# Patient Record
Sex: FEMALE
[De-identification: names, ages, dates, MRNs, and addresses within clinical notes are randomized per-mention and may not be internally consistent; named-entity substitution may affect disease eponyms.]

---

## 2024-08-27 PROBLEM — Z00.00 ENCOUNTER FOR PREVENTIVE HEALTH EXAMINATION: Status: ACTIVE | Noted: 2024-08-27

## 2024-08-28 ENCOUNTER — APPOINTMENT (OUTPATIENT)
Dept: OTOLARYNGOLOGY | Facility: CLINIC | Age: 24
End: 2024-08-28
Payer: COMMERCIAL

## 2024-08-28 ENCOUNTER — TRANSCRIPTION ENCOUNTER (OUTPATIENT)
Age: 24
End: 2024-08-28

## 2024-08-28 VITALS — BODY MASS INDEX: 18.51 KG/M2 | HEIGHT: 69 IN | WEIGHT: 125 LBS

## 2024-08-28 PROCEDURE — 99204 OFFICE O/P NEW MOD 45 MIN: CPT | Mod: 25

## 2024-08-28 PROCEDURE — 69801 INCISE INNER EAR: CPT | Mod: LT

## 2024-08-28 RX ORDER — PREDNISONE 10 MG
TABLET ORAL
Refills: 0 | Status: ACTIVE | COMMUNITY

## 2024-08-28 NOTE — HISTORY OF PRESENT ILLNESS
[de-identified] : Referred by ENT colleague for subspecialty evaluation. MAGNO VAUGHN has a history of sudden onset of hearing loss in the left ear on August 24, 2024. Started on prednisone 60mg per day 2 days ago. No perceived improvement. No known trigger except Covid infection 1 month ago with full recovery.  No skin lesions noted. No vertigo. Mild tinnitus reported No prior ear disease.  MRI reported normal done 8/26/24.

## 2024-08-28 NOTE — PROCEDURE
[FreeTextEntry3] : Procedure: LEFT Intratympanic vestibulo-active agent injection  Pre procedure diagnosis: Sudden Hearing Loss Post procedure diagnosis: Sudden Hearing Loss  Management options reviewed in detail. All risks limitations complications and alternatives reviewed with the patient who agreed. Injection: dexamethasone 10 mg/ml  0.3cc used Anesthesia: topical phenol  Dwell Time: 20 Minutes Estimated Blood Loss: None Complications: None

## 2024-08-28 NOTE — CONSULT LETTER
[Please see my note below.] : Please see my note below. [FreeTextEntry2] : Dear TABATHA VALLES  [FreeTextEntry1] : Thank you for allowing me to participate in the care of Jimmy Gilbert . Please see the attached visit note.    [facility] Medical Director of [facility] Healthcare Department of Otolaryngology [facility]

## 2024-08-28 NOTE — DATA REVIEWED
[de-identified] : Audiometry dated August 26, 2024 provided and reviewed.  Severe to profound hearing loss identified in the left ear with 0% speech recognition.

## 2024-08-28 NOTE — ASSESSMENT
[FreeTextEntry1] : Idiopathic sudden sensorineural hearing loss with no perceived response following 3 days of oral steroids.  We discussed management options in detail.  She wished to proceed with intratympanic therapy which was initiated today.  Wound instructions reviewed. Continued oral steroids recommended.   I have also provided her referral information for hyperbaric oxygen should she wish to consider this.  Follow-up recommended in 1 week with repeat audiometry.  Further therapy to be considered.

## 2024-09-03 ENCOUNTER — APPOINTMENT (OUTPATIENT)
Dept: OTOLARYNGOLOGY | Facility: CLINIC | Age: 24
End: 2024-09-03
Payer: COMMERCIAL

## 2024-09-03 PROBLEM — H91.22 SUDDEN LEFT HEARING LOSS: Status: ACTIVE | Noted: 2024-08-28

## 2024-09-03 PROCEDURE — 69801 INCISE INNER EAR: CPT | Mod: LT

## 2024-09-03 PROCEDURE — 92567 TYMPANOMETRY: CPT

## 2024-09-03 PROCEDURE — 99214 OFFICE O/P EST MOD 30 MIN: CPT | Mod: 25

## 2024-09-03 PROCEDURE — 92557 COMPREHENSIVE HEARING TEST: CPT

## 2024-09-03 NOTE — CONSULT LETTER
[Please see my note below.] : Please see my note below. [FreeTextEntry2] : Dear TABATHA VALLES  [FreeTextEntry1] : Thank you for allowing me to participate in the care of Jimmy Gilbert . Please see the attached visit note.    [facility] Medical Director of Hearing Healthcare Department of Otolaryngology [facility]

## 2024-09-03 NOTE — PHYSICAL EXAM
[Normal] : temporomandibular joint is normal [FreeTextEntry1] : Procedure: Microscopic Ear Exam  Right ear:   Ear canal intact without inflammation or lesion.   Tympanic membrane intact without inflammation.  Left ear:   Ear canal intact without inflammation or lesion.  Surgical puncture sites remain.  No inflammation.

## 2024-09-03 NOTE — DATA REVIEWED
[de-identified] : In light of the patients current symptoms, Complete audiometry was ordered and completed today. I have interpreted these results and reviewed them in detail with the patient.  Slight improvement in hearing thresholds identified with poor speech recognition.  Patent tympanometry

## 2024-09-03 NOTE — HISTORY OF PRESENT ILLNESS
[de-identified] :  Referred by ENT colleague for subspecialty evaluation. MAGNO VAUGHN has a history of sudden onset of hearing loss in the left ear on August 24, 2024. Started on prednisone 60mg per day 2 days ago. No perceived improvement. No known trigger except Covid infection 1 month ago with full recovery. No skin lesions noted. No vertigo. Mild tinnitus reported No prior ear disease. MRI reported normal done 8/26/24. [FreeTextEntry1] : 09/03/2024 Possible slight improvement in hearing reported.  There is a crackling noise noticed.  MIld tinnitus unchanged. No perceived hearing of speech/recognition reported.  Recently started on hyperbaric oxygen.  Her first treatment was today. On 40mg today, 30 tomorrow prednisone.

## 2024-09-06 PROBLEM — H90.42 SENSORINEURAL HEARING LOSS (SNHL) OF LEFT EAR WITH UNRESTRICTED HEARING OF RIGHT EAR: Status: ACTIVE | Noted: 2024-08-28

## 2024-09-09 ENCOUNTER — APPOINTMENT (OUTPATIENT)
Dept: OTOLARYNGOLOGY | Facility: CLINIC | Age: 24
End: 2024-09-09
Payer: COMMERCIAL

## 2024-09-09 VITALS — HEIGHT: 69 IN | WEIGHT: 125 LBS | BODY MASS INDEX: 18.51 KG/M2

## 2024-09-09 DIAGNOSIS — H93.299 OTHER ABNORMAL AUDITORY PERCEPTIONS, UNSPECIFIED EAR: ICD-10-CM

## 2024-09-09 PROCEDURE — 99214 OFFICE O/P EST MOD 30 MIN: CPT | Mod: 25

## 2024-09-09 PROCEDURE — 69801 INCISE INNER EAR: CPT | Mod: LT

## 2024-09-09 PROCEDURE — 92557 COMPREHENSIVE HEARING TEST: CPT

## 2024-09-09 PROCEDURE — 92550 TYMPANOMETRY & REFLEX THRESH: CPT | Mod: 52

## 2024-09-09 NOTE — HISTORY OF PRESENT ILLNESS
[de-identified] :  Referred by ENT colleague for subspecialty evaluation. MAGNO VAUGHN has a history of sudden onset of hearing loss in the left ear on August 24, 2024. Started on prednisone 60mg per day 2 days ago. No perceived improvement. No known trigger except Covid infection 1 month ago with full recovery. No skin lesions noted. No vertigo. Mild tinnitus reported No prior ear disease. MRI reported normal done 8/26/24. [FreeTextEntry1] : 09/09/2024 No perceive change in hearing. No ear pain.  Continues on oral prednisone now at 10 mg daily.  Also continues hyperbaric oxygen

## 2024-09-09 NOTE — PROCEDURE
[FreeTextEntry3] : Procedure: LEFT Intratympanic vestibulo-active agent injection  Pre procedure diagnosis: Sudden Hearing Loss Post procedure diagnosis: Sudden Hearing Loss  Management options reviewed in detail. All risks limitations complications and alternatives reviewed with the patient who agreed. Injection: dexamethasone 10 mg/ml  0.3cc used Anesthesia: none   Dwell Time: 20 Minutes Estimated Blood Loss: None Complications: None

## 2024-09-09 NOTE — ASSESSMENT
[FreeTextEntry1] :  Sudden hearing loss with minimal recovery.  Now completed 3 intratympanic steroid injections.  Continues on oral steroids and hyperbaric oxygen.  Clinical monitoring advised.  Follow-up with repeat audiometry in 1 week.

## 2024-09-09 NOTE — DATA REVIEWED
[de-identified] : In light of the patients current symptoms, Complete audiometry was ordered and completed today. I have interpreted these results and reviewed them in detail with the patient.  Severe to moderate hearing loss with slightly improved speech recognition left ear

## 2024-09-16 ENCOUNTER — APPOINTMENT (OUTPATIENT)
Dept: OTOLARYNGOLOGY | Facility: CLINIC | Age: 24
End: 2024-09-16
Payer: COMMERCIAL

## 2024-09-16 VITALS — HEIGHT: 69 IN | WEIGHT: 125 LBS | BODY MASS INDEX: 18.51 KG/M2

## 2024-09-16 DIAGNOSIS — H90.42 SENSORINEURAL HEARING LOSS, UNILATERAL, LEFT EAR, WITH UNRESTRICTED HEARING ON THE CONTRALATERAL SIDE: ICD-10-CM

## 2024-09-16 DIAGNOSIS — H91.22 SUDDEN IDIOPATHIC HEARING LOSS, LEFT EAR: ICD-10-CM

## 2024-09-16 PROCEDURE — 69801 INCISE INNER EAR: CPT | Mod: LT

## 2024-09-16 PROCEDURE — 92567 TYMPANOMETRY: CPT

## 2024-09-16 PROCEDURE — 99214 OFFICE O/P EST MOD 30 MIN: CPT | Mod: 25

## 2024-09-16 PROCEDURE — 92557 COMPREHENSIVE HEARING TEST: CPT

## 2024-09-16 RX ORDER — CIPROFLOXACIN AND DEXAMETHASONE 3; 1 MG/ML; MG/ML
0.3-0.1 SUSPENSION/ DROPS AURICULAR (OTIC)
Qty: 8 | Refills: 0 | Status: ACTIVE | COMMUNITY
Start: 2024-08-25

## 2024-09-16 RX ORDER — PREDNISONE 10 MG/1
10 TABLET ORAL
Qty: 35 | Refills: 0 | Status: ACTIVE | COMMUNITY
Start: 2024-09-16 | End: 1900-01-01

## 2024-09-16 NOTE — ASSESSMENT
[FreeTextEntry1] : Improving hearing loss in the left ear following treatment for sudden sensorineural hearing loss.  She has completed oral steroids and hyperbaric oxygen.  She has completed 3 intratympanic steroid injections.  We carefully reviewed management options in detail.  There appears to be improvement in the high frequencies which may suggest response to intratympanic therapy.  She wished to proceed with intratympanic therapy which was achieved today.  We also discussed the option of further oral steroids which she wished to proceed with.  Risks benefits and alternatives discussed.  I have placed her on a high-dose steroid taper.  Follow-up with repeat audiometry in 3 weeks and as needed.

## 2024-09-16 NOTE — DATA REVIEWED
[de-identified] : In light of the patients current symptoms, Complete audiometry was ordered and completed today. I have interpreted these results and reviewed them in detail with the patient.  Improved high-frequency hearing loss with persistent impaired speech recognition

## 2024-09-16 NOTE — REASON FOR VISIT
[Subsequent Evaluation] : a subsequent evaluation for [FreeTextEntry2] : sudden hearing loss in the left ear

## 2024-09-16 NOTE — PHYSICAL EXAM
[Normal] : temporomandibular joint is normal [FreeTextEntry1] : Procedure: Microscopic Ear Exam  Right ear:   Ear canal intact without inflammation or lesion.   Tympanic membrane intact without inflammation.  Left ear:   Ear canal intact without inflammation or lesion.  Small puncture sites now healed following intratympanic injection.  No inflammation.

## 2024-09-16 NOTE — CONSULT LETTER
[Please see my note below.] : Please see my note below. [FreeTextEntry2] : Dear TABATHA VALLES  [FreeTextEntry1] : Thank you for allowing me to participate in the care of Jimmy Gilbert . Please see the attached visit note.    [facility] Medical Director of [facility] Department of Otolaryngology [facility]

## 2024-09-16 NOTE — HISTORY OF PRESENT ILLNESS
[de-identified] : Referred by ENT colleague for subspecialty evaluation. MAGNO VAUGHN has a history of sudden onset of hearing loss in the left ear on August 24, 2024. Started on prednisone 60mg per day 2 days ago. No perceived improvement. No known trigger except Covid infection 1 month ago with full recovery. No skin lesions noted. No vertigo. Mild tinnitus reported No prior ear disease. MRI reported normal done 8/26/24. [FreeTextEntry1] : 9/16/2024 No perceived change in hearing reported. No more HBO reported, completed on 9/9/24. No ear pain or otorrhea. Completed oral steroids on 9/12/24.  No adverse effects except facial acne.  Earlier dysgeusia has resolved.

## 2024-10-15 ENCOUNTER — APPOINTMENT (OUTPATIENT)
Dept: OTOLARYNGOLOGY | Facility: CLINIC | Age: 24
End: 2024-10-15
Payer: COMMERCIAL

## 2024-10-15 DIAGNOSIS — H93.299 OTHER ABNORMAL AUDITORY PERCEPTIONS, UNSPECIFIED EAR: ICD-10-CM

## 2024-10-15 DIAGNOSIS — H91.22 SUDDEN IDIOPATHIC HEARING LOSS, LEFT EAR: ICD-10-CM

## 2024-10-15 DIAGNOSIS — H90.42 SENSORINEURAL HEARING LOSS, UNILATERAL, LEFT EAR, WITH UNRESTRICTED HEARING ON THE CONTRALATERAL SIDE: ICD-10-CM

## 2024-10-15 PROCEDURE — 92550 TYMPANOMETRY & REFLEX THRESH: CPT | Mod: 52

## 2024-10-15 PROCEDURE — 92504 EAR MICROSCOPY EXAMINATION: CPT

## 2024-10-15 PROCEDURE — 92557 COMPREHENSIVE HEARING TEST: CPT

## 2024-10-15 PROCEDURE — 99214 OFFICE O/P EST MOD 30 MIN: CPT | Mod: 25

## 2024-12-17 ENCOUNTER — NON-APPOINTMENT (OUTPATIENT)
Age: 24
End: 2024-12-17

## 2024-12-17 ENCOUNTER — APPOINTMENT (OUTPATIENT)
Dept: OTOLARYNGOLOGY | Facility: CLINIC | Age: 24
End: 2024-12-17
Payer: COMMERCIAL

## 2024-12-17 VITALS — WEIGHT: 125 LBS | BODY MASS INDEX: 18.51 KG/M2 | HEIGHT: 69 IN

## 2024-12-17 DIAGNOSIS — H91.22 SUDDEN IDIOPATHIC HEARING LOSS, LEFT EAR: ICD-10-CM

## 2024-12-17 DIAGNOSIS — H90.42 SENSORINEURAL HEARING LOSS, UNILATERAL, LEFT EAR, WITH UNRESTRICTED HEARING ON THE CONTRALATERAL SIDE: ICD-10-CM

## 2024-12-17 PROCEDURE — 99213 OFFICE O/P EST LOW 20 MIN: CPT

## 2024-12-17 PROCEDURE — 92504 EAR MICROSCOPY EXAMINATION: CPT

## 2024-12-17 PROCEDURE — 92567 TYMPANOMETRY: CPT

## 2024-12-17 PROCEDURE — 92557 COMPREHENSIVE HEARING TEST: CPT

## 2025-03-18 ENCOUNTER — APPOINTMENT (OUTPATIENT)
Dept: OTOLARYNGOLOGY | Facility: CLINIC | Age: 25
End: 2025-03-18